# Patient Record
Sex: FEMALE | Race: WHITE | NOT HISPANIC OR LATINO
[De-identification: names, ages, dates, MRNs, and addresses within clinical notes are randomized per-mention and may not be internally consistent; named-entity substitution may affect disease eponyms.]

---

## 2018-02-28 ENCOUNTER — RESULT REVIEW (OUTPATIENT)
Age: 68
End: 2018-02-28

## 2018-03-01 ENCOUNTER — APPOINTMENT (OUTPATIENT)
Dept: DERMATOLOGY | Facility: CLINIC | Age: 68
End: 2018-03-01
Payer: MEDICARE

## 2018-03-01 PROCEDURE — 99215 OFFICE O/P EST HI 40 MIN: CPT | Mod: 25

## 2018-03-01 PROCEDURE — 17000 DESTRUCT PREMALG LESION: CPT

## 2018-03-01 PROCEDURE — 11100 BX SKIN SUBCUTANEOUS&/MUCOUS MEMBRANE 1 LESION: CPT | Mod: 59

## 2018-03-22 ENCOUNTER — APPOINTMENT (OUTPATIENT)
Dept: DERMATOLOGY | Facility: CLINIC | Age: 68
End: 2018-03-22

## 2018-04-05 ENCOUNTER — APPOINTMENT (OUTPATIENT)
Dept: DERMATOLOGY | Facility: CLINIC | Age: 68
End: 2018-04-05
Payer: MEDICARE

## 2018-04-05 PROCEDURE — 99213 OFFICE O/P EST LOW 20 MIN: CPT | Mod: 25

## 2018-04-05 PROCEDURE — 17261 DSTRJ MAL LES T/A/L .6-1.0CM: CPT

## 2018-07-25 ENCOUNTER — APPOINTMENT (OUTPATIENT)
Dept: DERMATOLOGY | Facility: CLINIC | Age: 68
End: 2018-07-25

## 2023-06-06 ENCOUNTER — APPOINTMENT (OUTPATIENT)
Dept: NEUROSURGERY | Facility: CLINIC | Age: 73
End: 2023-06-06
Payer: MEDICARE

## 2023-06-06 VITALS
WEIGHT: 150 LBS | BODY MASS INDEX: 25.61 KG/M2 | DIASTOLIC BLOOD PRESSURE: 84 MMHG | SYSTOLIC BLOOD PRESSURE: 126 MMHG | HEIGHT: 64 IN | HEART RATE: 74 BPM

## 2023-06-06 DIAGNOSIS — M47.812 SPONDYLOSIS W/OUT MYELOPATHY OR RADICULOPATHY, CERVICAL REGION: ICD-10-CM

## 2023-06-06 DIAGNOSIS — M43.12 SPONDYLOLISTHESIS, CERVICAL REGION: ICD-10-CM

## 2023-06-06 PROCEDURE — 99214 OFFICE O/P EST MOD 30 MIN: CPT

## 2023-06-06 PROCEDURE — 99204 OFFICE O/P NEW MOD 45 MIN: CPT

## 2023-06-06 NOTE — RESULTS/DATA
[FreeTextEntry1] : X-rays of the cervical spine show spondylolisthesis at C4-C5 as I reviewed past imaging including a CT scan from 2021 this appears unchanged from previous imaging

## 2023-06-06 NOTE — ASSESSMENT
[FreeTextEntry1] : This is a 73-year-old female referred by Dr. Brown for concerns of findings on cervical x-rays.  These images have been reviewed with the patient as well as with Dr. Brunson who saw the patient and discussed that there is no acute neurosurgical intervention warranted if the patient were to require further work-up with an MRI she would have to consult further with Dr. Buck about her spinal cord stimulator.  If she would like to have CT scans of the cervical spine and lumbar spine we would be happy to review them once completed.  In the meantime I recommend returning to her neurologist for further work-up and management she understands and agrees with plan all her questions were answered and they were satisfied with the visit

## 2023-06-06 NOTE — REASON FOR VISIT
[New Patient Visit] : a new patient visit [FreeTextEntry1] : pt 72 y/o F presents in office with c/o lower back pain and weakness in legs no other complaints

## 2023-06-06 NOTE — HISTORY OF PRESENT ILLNESS
[FreeTextEntry1] : Cervical spondylolisthesis [de-identified] : This is a 73-year-old female that referred by Dr. Garcia from neurology and is requested us to review cervical x-rays.  The present problem with the patient is that over the past 8 weeks she feels that she is becoming weaker in her legs and her upper body she feels that she is slumping over in her chair.  She feels that this is going on for a week.  She denies any new pain.  She has chronic back pain which she has spinal cord stimulator placed in 2016 by Dr. Lares she has also seen Dr. Hinkle in the past for consideration removal of stimulator and replacement with MRI compatible stimulator.  She is also seen Dr. Aguero for surgical opinion and no surgery was offered at that time.  She denies any new numbness tingling weakness or bladder bowel dysfunction.  She presents today with her  she started physical therapy but has now stopped awaiting further updated neurosurgical opinion

## 2023-06-22 ENCOUNTER — RX ONLY (RX ONLY)
Age: 73
End: 2023-06-22

## 2023-08-01 ENCOUNTER — OFFICE (OUTPATIENT)
Facility: LOCATION | Age: 73
Setting detail: OPHTHALMOLOGY
End: 2023-08-01
Payer: MEDICARE

## 2023-08-01 ENCOUNTER — RX ONLY (RX ONLY)
Age: 73
End: 2023-08-01

## 2023-08-01 DIAGNOSIS — H43.393: ICD-10-CM

## 2023-08-01 DIAGNOSIS — H16.223: ICD-10-CM

## 2023-08-01 DIAGNOSIS — H35.3132: ICD-10-CM

## 2023-08-01 PROCEDURE — 92134 CPTRZ OPH DX IMG PST SGM RTA: CPT | Performed by: OPHTHALMOLOGY

## 2023-08-01 PROCEDURE — 92014 COMPRE OPH EXAM EST PT 1/>: CPT | Performed by: OPHTHALMOLOGY

## 2023-08-01 ASSESSMENT — CONFRONTATIONAL VISUAL FIELD TEST (CVF)
OD_FINDINGS: FULL
OS_FINDINGS: FULL

## 2023-08-01 ASSESSMENT — SUPERFICIAL PUNCTATE KERATITIS (SPK)
OS_SPK: T
OD_SPK: T

## 2023-08-01 ASSESSMENT — CORNEAL DYSTROPHY - POSTERIOR
OD_POSTERIORDYSTROPHY: GUTTATA
OS_POSTERIORDYSTROPHY: GUTTATA

## 2023-08-10 ASSESSMENT — REFRACTION_CURRENTRX
OS_OVR_VA: 20/
OD_OVR_VA: 20/
OS_OVR_VA: 20/
OD_OVR_VA: 20/

## 2023-08-10 ASSESSMENT — VISUAL ACUITY
OS_BCVA: 20/40
OD_BCVA: 20/20-2

## 2024-01-04 ENCOUNTER — APPOINTMENT (OUTPATIENT)
Dept: DERMATOLOGY | Facility: CLINIC | Age: 74
End: 2024-01-04
Payer: MEDICARE

## 2024-01-04 PROCEDURE — 99204 OFFICE O/P NEW MOD 45 MIN: CPT

## 2024-03-27 ENCOUNTER — APPOINTMENT (OUTPATIENT)
Dept: ORTHOPEDIC SURGERY | Facility: CLINIC | Age: 74
End: 2024-03-27
Payer: MEDICARE

## 2024-03-27 DIAGNOSIS — Z96.619 PAIN IN UNSPECIFIED SHOULDER: ICD-10-CM

## 2024-03-27 DIAGNOSIS — M75.52 BURSITIS OF LEFT SHOULDER: ICD-10-CM

## 2024-03-27 DIAGNOSIS — M25.519 PAIN IN UNSPECIFIED SHOULDER: ICD-10-CM

## 2024-03-27 PROCEDURE — 73030 X-RAY EXAM OF SHOULDER: CPT | Mod: LT

## 2024-03-27 PROCEDURE — 99204 OFFICE O/P NEW MOD 45 MIN: CPT

## 2024-03-27 NOTE — DISCUSSION/SUMMARY
[de-identified] : 74 year old patient presents today complaining of left shoulder pain times many years. X-ray demonstrates long stemmed hemiarthroplasty that is well-fixed Patient complains of lateral shoulder pain I explained to patient that this is not a modular system and osteotomy and revision to reverse shoulder arthroplasty in my hands would be complex Patient has some left deltoid atrophy and therefore even if surgery was efficient, she may not have any significant improvement in inability to raise the shoulder.  I provided the names of several surgeons at tertiary care facilities that could handle complex revision surgery.  (1) We discussed a comprehensive treatment plans that included a prescription management plan involving the use of prescription strength medications to include Ibuprofen 600-800 mg TID, versus 500-650 mg Tylenol. We also discussed prescribing topical diclofenac (Voltaren gel) as well as once daily Meloxicam 15 mg. (2) The patient has More Than One chronic injuries/illnesses as outlined, discussed, and documented by ICD 10 codes listed, as well as the HPI and Plan section. There is a moderate risk of morbidity with further treatment, especially from use of prescription strength medications and possible side effects of these medications which include upset stomach and cardiac/renal issues with long term use were discussed. (3) I recommended that the patient follow-up with their medical physician to discuss any significant specific potential issues with long term use such as interactions with current medications or with exacerbation of underlying medical morbidities.   Attestation: I, Renetta Hernandez , attest that this documentation has been prepared under the direction and in the presence of Provider Elier Nair MD. The documentation recorded by the scribe, in my presence, accurately reflects the service I personally performed, and the decisions made by me with my edits as appropriate. Elier Nair MD

## 2024-03-27 NOTE — PHYSICAL EXAM
[Left] : left shoulder [Type 2 acromion] : Type 2 acromion [Components well fixed, in good position] : Components well fixed, in good position [de-identified] : Constitutional: The general appearance of the patient is well developed, well nourished, no deformities and well groomed. Normal   Gait: Gait and function is as follows: normal gait.   Skin: Head and neck visualized skin is normal. Left upper extremity visualized skin is normal. Right upper extremity visualized skin is normal. Thoracic Skin of the thoracic spine shows visualized skin is normal.   Cardiovascular: palpable radial pulse bilaterally, good capillary refill in digits of the bilateral upper extremities and no temperature or color changes in the bilateral upper extremities.   Lymphatic: Normal Palpation of lymph nodes in the cervical.   Neurologic: fine motor control in the bilateral upper extremities is intact. Deep Tendon Reflexes in Upper and Lower Extremities Negative Ortega's in the bilateral upper extremities. The patient is oriented to time, place and person. Sensation to light touch intact in the bilateral upper extremities. Mood and Affect is normal.   Right Shoulder: Inspection of the shoulder/upper arm is as follows: There is mild pain with range of motion of the shoulder   Left Shoulder: Inspection of the shoulder/upper arm is as follows: no scapula winging, no biceps deformity and no AC joint deformity. Palpation of the shoulder/upper arm is as follows: There is tenderness at the proximal biceps tendon. Range of motion of the shoulder is as follows: Pain with internal rotation, external rotation, abduction and forward flexion. Strength of the shoulder is as follows: Supraspinatus 4/5. External Rotation 4/5. Internal Rotation 4/5. Deltoid 5/5 Ligament Stability and Special Tests of the shoulder is as follows: Neer test is positive. Hurley' test is positive. Speed's test is positive.   Neck:   Inspection / Palpation of the cervical spine is as follows: mild paracervical tenderness. Range of motion of the cervical spine is as follows: moderately decreased range of motion of the cervical spine. Stability testing for the cervical spine is as follows Stable range of motion.   Back, including spine: Inspection / Palpation of the thoracic/lumbar spine is as follows: There is a full, pain free, stable range of motion of the thoracic spine with a normal tone and not tenderness to palpation..

## 2024-03-27 NOTE — HISTORY OF PRESENT ILLNESS
[de-identified] : Patient presents today complaining of left shoulder pain times many years.  Has history of previous total shoulder arthroplasty performed by Dr. Schwartz more than 10 years ago.  She reports significant pain and limited range of motion.  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  Patient has been in and out of the hospital multiple occasions and recently spent 38 days in rehab facility secondary to lower extremity weakness.  Patient underwent right reverse total shoulder arthroplasty approximately 8 years ago with excellent results.

## 2024-10-04 ENCOUNTER — APPOINTMENT (OUTPATIENT)
Dept: DERMATOLOGY | Facility: CLINIC | Age: 74
End: 2024-10-04
Payer: MEDICARE

## 2024-10-04 PROCEDURE — 17000 DESTRUCT PREMALG LESION: CPT

## 2024-10-04 PROCEDURE — 17003 DESTRUCT PREMALG LES 2-14: CPT

## 2024-10-04 PROCEDURE — 99213 OFFICE O/P EST LOW 20 MIN: CPT | Mod: 25

## 2025-01-30 ENCOUNTER — OFFICE (OUTPATIENT)
Dept: URBAN - METROPOLITAN AREA CLINIC 12 | Facility: CLINIC | Age: 75
Setting detail: OPHTHALMOLOGY
End: 2025-01-30
Payer: MEDICARE

## 2025-01-30 DIAGNOSIS — H16.223: ICD-10-CM

## 2025-01-30 PROCEDURE — 99213 OFFICE O/P EST LOW 20 MIN: CPT | Performed by: OPHTHALMOLOGY

## 2025-01-30 ASSESSMENT — CONFRONTATIONAL VISUAL FIELD TEST (CVF)
OD_FINDINGS: FULL
OS_FINDINGS: FULL

## 2025-01-30 ASSESSMENT — CORNEAL DYSTROPHY - POSTERIOR
OD_POSTERIORDYSTROPHY: GUTTATA
OS_POSTERIORDYSTROPHY: GUTTATA

## 2025-01-30 ASSESSMENT — SUPERFICIAL PUNCTATE KERATITIS (SPK)
OS_SPK: T
OD_SPK: T

## 2025-01-30 ASSESSMENT — KERATOMETRY
OS_K1POWER_DIOPTERS: UNBALE
OD_K1POWER_DIOPTERS: UNABLE

## 2025-01-30 ASSESSMENT — REFRACTION_CURRENTRX
OS_OVR_VA: 20/
OD_OVR_VA: 20/
OS_OVR_VA: 20/
OD_OVR_VA: 20/

## 2025-01-30 ASSESSMENT — VISUAL ACUITY
OD_BCVA: 20/40-2
OS_BCVA: 20/30-2

## 2025-01-30 ASSESSMENT — REFRACTION_AUTOREFRACTION
OS_SPHERE: UNABLE
OD_SPHERE: UNABLE